# Patient Record
Sex: FEMALE | Race: WHITE | NOT HISPANIC OR LATINO | Employment: OTHER | URBAN - NONMETROPOLITAN AREA
[De-identification: names, ages, dates, MRNs, and addresses within clinical notes are randomized per-mention and may not be internally consistent; named-entity substitution may affect disease eponyms.]

---

## 2018-01-17 RX ORDER — CIPROFLOXACIN 500 MG/1
500 TABLET, FILM COATED ORAL 2 TIMES DAILY
COMMUNITY

## 2018-01-17 RX ORDER — HYDROXYZINE 50 MG/1
TABLET, FILM COATED ORAL 3 TIMES DAILY
COMMUNITY
Start: 2015-05-01

## 2018-01-17 RX ORDER — DOXEPIN HYDROCHLORIDE 100 MG/1
CAPSULE ORAL
COMMUNITY
Start: 2015-05-01

## 2018-01-17 RX ORDER — BUMETANIDE 2 MG/1
TABLET ORAL
COMMUNITY
Start: 2015-05-01

## 2018-01-17 RX ORDER — PROPRANOLOL HYDROCHLORIDE 80 MG/1
TABLET ORAL EVERY 12 HOURS
COMMUNITY
Start: 2015-05-01

## 2018-01-17 RX ORDER — MELOXICAM 15 MG/1
TABLET ORAL DAILY
COMMUNITY
Start: 2015-05-01

## 2018-01-17 RX ORDER — HYDROCODONE BITARTRATE AND ACETAMINOPHEN 10; 325 MG/1; MG/1
TABLET ORAL
COMMUNITY
Start: 2015-05-01

## 2018-01-17 RX ORDER — ALBUTEROL SULFATE 2.5 MG/3ML
SOLUTION RESPIRATORY (INHALATION)
COMMUNITY
Start: 2015-05-14

## 2018-01-17 RX ORDER — CYANOCOBALAMIN 1000 UG/ML
INJECTION, SOLUTION INTRAMUSCULAR; SUBCUTANEOUS
COMMUNITY
Start: 2015-02-03

## 2018-01-17 RX ORDER — POTASSIUM CHLORIDE 750 MG/1
CAPSULE, EXTENDED RELEASE ORAL 2 TIMES DAILY
COMMUNITY
Start: 2015-05-01

## 2018-01-17 RX ORDER — NITROGLYCERIN 0.4 MG/1
TABLET SUBLINGUAL
COMMUNITY
Start: 2015-05-01

## 2018-01-17 RX ORDER — MONTELUKAST SODIUM 10 MG/1
TABLET ORAL
COMMUNITY
Start: 2015-05-01

## 2018-01-17 RX ORDER — LANCETS 33 GAUGE
EACH MISCELLANEOUS
COMMUNITY
Start: 2015-08-24

## 2018-01-17 RX ORDER — LORATADINE 10 MG/1
CAPSULE, LIQUID FILLED ORAL DAILY
COMMUNITY
Start: 2015-05-01

## 2018-01-17 RX ORDER — FLUTICASONE PROPIONATE 50 MCG
2 SPRAY, SUSPENSION (ML) NASAL DAILY
COMMUNITY

## 2018-01-17 RX ORDER — OMEPRAZOLE 20 MG/1
CAPSULE, DELAYED RELEASE ORAL 2 TIMES DAILY
COMMUNITY
Start: 2015-05-01

## 2018-01-17 RX ORDER — METFORMIN HYDROCHLORIDE 500 MG/1
TABLET, EXTENDED RELEASE ORAL
COMMUNITY
Start: 2015-05-01

## 2018-01-17 RX ORDER — SIMVASTATIN 40 MG
TABLET ORAL
COMMUNITY
Start: 2015-05-14

## 2018-01-17 RX ORDER — LEVOTHYROXINE SODIUM 0.12 MG/1
TABLET ORAL DAILY
COMMUNITY
Start: 2015-05-01

## 2018-01-17 RX ORDER — PENTOXIFYLLINE 400 MG/1
400 TABLET, EXTENDED RELEASE ORAL
COMMUNITY

## 2018-01-17 RX ORDER — TRIAMCINOLONE ACETONIDE 0.25 MG/G
CREAM TOPICAL 2 TIMES DAILY
COMMUNITY

## 2018-01-17 RX ORDER — PSEUDOEPHEDRINE HCL 30 MG
TABLET ORAL 3 TIMES DAILY
COMMUNITY
Start: 2015-05-01

## 2019-05-07 ENCOUNTER — TELEPHONE (OUTPATIENT)
Dept: SURGERY | Facility: CLINIC | Age: 69
End: 2019-05-07

## 2019-05-07 NOTE — TELEPHONE ENCOUNTER
PT HAD AN APPT SCHEDULED AT 9:45. I CALLED THE HOME NUMBERTO SEE IF SHE WANTED TO R/S. A MAN ANSWERED AND SAID SHE WAS ON HER WAY. I TOLD HIM WE WOULD WAIT ON HER. BY 11:15 SHE STILL HAD NOT SHOWN UP. PT CELL IS DISCONNECTED.  I CALLED THE HOME NUMBER BACK AND ASK THE MAN IF YOVANI WOULD LIKE TO R/S AND HE SAID NO SHE IS ON HER WAY. I TOLD HIM HER APPT. WAS AT 9:45 AND THAT DR. RUGGIERO LEAVES AFTER LUNCH TO SEE PATIENTS IN [place]. HE SAID SHE WAS COMING. PATIENT NEVER SHOWED STILL. I WAS CONTACTED 2 MORE TIMES ASKING IF SHE HAD SHOWN THAT SHE HAD LEFT AT 8 AM TO GO TO HER APPT. THEN I WAS CONTACTED AT 1:30 BY A WOMAN ASKING THE SAME QUESTIONS. I TOLD HER SHE NEVER SHOWED FOR HER APPT AND WE HAVE NOT HEARD FROM HER. SHE SAID SHE IS MISSING AND THEY ARE SENDING PPL TO LOOK FOR HER AND IF WE SEE OR HEAR FROM HER TO CALL THE HOME NUMBER.

## 2019-10-17 ENCOUNTER — TELEPHONE (OUTPATIENT)
Dept: CARDIOLOGY | Facility: CLINIC | Age: 69
End: 2019-10-17

## 2019-10-17 NOTE — TELEPHONE ENCOUNTER
THE PT CALLED THE OFFICE REQUESTING AN ORDER FOR O2.  THE PM CALLED THE PT AND EXPLAINED SHE IS CONSIDERED A NEW PT DUE TO NO F/U IN >3YRS.  THE PT STATED HER PCP INSTRUCTED HER TO HAVE HER CARDIOLOGIST TO ORDER THE OXYGEN.  THE PT WAS OFFERED AN APPT HOWEVER, DECLINED TO SCHEDULE AN APPT AT THIS TIME, DUE TO MULTIPLE UPCOMING APPTS.  THE PT WAS INSTRUCTED TO CALL THE OFFICE WHEN SHE IS READY TO SCHEDULE AN APPT.

## 2020-10-08 ENCOUNTER — TELEPHONE (OUTPATIENT)
Dept: SURGERY | Facility: CLINIC | Age: 70
End: 2020-10-08

## 2020-10-08 NOTE — TELEPHONE ENCOUNTER
[facility] REQUESTED COPY OF RECENT COLONOSCOPY. I DON'T HAVE A COPY OF THAT IN OUR SYSTEM. I FAXED THE LAST OFFICE NOTE [phone].